# Patient Record
Sex: FEMALE | Employment: FULL TIME | URBAN - METROPOLITAN AREA
[De-identification: names, ages, dates, MRNs, and addresses within clinical notes are randomized per-mention and may not be internally consistent; named-entity substitution may affect disease eponyms.]

---

## 2023-03-28 ENCOUNTER — TELEPHONE (OUTPATIENT)
Dept: PRIMARY CARE | Facility: CLINIC | Age: 43
End: 2023-03-28
Payer: COMMERCIAL

## 2023-03-28 NOTE — TELEPHONE ENCOUNTER
Pt made an appointment for next week for med refill but she is going to be out before then, can you send her generic lexapro in to [facility] in [facility]

## 2023-04-04 ENCOUNTER — OFFICE VISIT (OUTPATIENT)
Dept: PRIMARY CARE | Facility: CLINIC | Age: 43
End: 2023-04-04
Payer: COMMERCIAL

## 2023-04-04 VITALS
BODY MASS INDEX: 24.51 KG/M2 | DIASTOLIC BLOOD PRESSURE: 74 MMHG | SYSTOLIC BLOOD PRESSURE: 107 MMHG | OXYGEN SATURATION: 98 % | HEART RATE: 78 BPM | TEMPERATURE: 97.5 F | WEIGHT: 162.4 LBS

## 2023-04-04 DIAGNOSIS — Z12.31 ENCOUNTER FOR SCREENING MAMMOGRAM FOR BREAST CANCER: ICD-10-CM

## 2023-04-04 DIAGNOSIS — J45.990 EXERCISE-INDUCED ASTHMA (HHS-HCC): ICD-10-CM

## 2023-04-04 DIAGNOSIS — Z12.11 COLON CANCER SCREENING: ICD-10-CM

## 2023-04-04 DIAGNOSIS — Z00.00 VISIT FOR PREVENTIVE HEALTH EXAMINATION: Primary | ICD-10-CM

## 2023-04-04 PROBLEM — M54.12 CERVICAL RADICULAR PAIN: Status: ACTIVE | Noted: 2023-04-04

## 2023-04-04 PROBLEM — F43.22 ADJUSTMENT DISORDER WITH ANXIOUS MOOD: Status: ACTIVE | Noted: 2023-04-04

## 2023-04-04 PROBLEM — D64.9 ANEMIA: Status: ACTIVE | Noted: 2023-04-04

## 2023-04-04 PROBLEM — B37.9 YEAST INFECTION: Status: ACTIVE | Noted: 2023-04-04

## 2023-04-04 PROCEDURE — 99396 PREV VISIT EST AGE 40-64: CPT | Performed by: FAMILY MEDICINE

## 2023-04-04 PROCEDURE — 1036F TOBACCO NON-USER: CPT | Performed by: FAMILY MEDICINE

## 2023-04-04 RX ORDER — MULTIVITAMIN
TABLET ORAL
COMMUNITY
Start: 2019-03-20

## 2023-04-04 RX ORDER — BUSPIRONE HYDROCHLORIDE 10 MG/1
1 TABLET ORAL 3 TIMES DAILY
COMMUNITY
Start: 2019-06-25 | End: 2023-04-04 | Stop reason: ALTCHOICE

## 2023-04-04 RX ORDER — ESCITALOPRAM OXALATE 5 MG/1
5 TABLET ORAL DAILY
COMMUNITY
End: 2023-04-07 | Stop reason: SDUPTHER

## 2023-04-04 RX ORDER — METHYLPREDNISOLONE 4 MG/1
TABLET ORAL
COMMUNITY
Start: 2021-06-16

## 2023-04-04 RX ORDER — ALBUTEROL SULFATE 90 UG/1
2 AEROSOL, METERED RESPIRATORY (INHALATION) EVERY 4 HOURS PRN
Qty: 8.5 G | Refills: 0 | Status: SHIPPED | OUTPATIENT
Start: 2023-04-04 | End: 2023-05-04

## 2023-04-04 ASSESSMENT — PATIENT HEALTH QUESTIONNAIRE - PHQ9
SUM OF ALL RESPONSES TO PHQ9 QUESTIONS 1 AND 2: 0
1. LITTLE INTEREST OR PLEASURE IN DOING THINGS: NOT AT ALL
2. FEELING DOWN, DEPRESSED OR HOPELESS: NOT AT ALL

## 2023-04-04 NOTE — PROGRESS NOTES
Subjective   Patient ID: Alis Marlow is a 42 y.o. female who presents for Med Refill (Medication refill).  Very pleasant 42-year-old here today for annual wellness exam  Her previous history of adjustment disorder that is although she is doing very well  She had some exercise-induced asthma since she had COVID tightness and congestion no fever chills she has not had any hearing or visual issues no fever chills not a smoker Works outside the home no new concerns        Review of Systems  Constitutional: no chills, no fever and no night sweats.   Eyes: no blurred vision and no eyesight problems.   ENT: no hearing loss, no nasal congestion, no nasal discharge, no hoarseness and no sore throat.   Cardiovascular: no chest pain, no intermittent leg claudication, no lower extremity edema, no palpitations and no syncope.   Respiratory: no cough, no shortness of breath during exertion, no shortness of breath at rest and no wheezing.   Gastrointestinal: no abdominal pain, no blood in stools, no constipation, no diarrhea, no melena, no nausea, no rectal pain and no vomiting.   Genitourinary: no dysuria, no change in urinary frequency, no urinary hesitancy, no feelings of urinary urgency and no vaginal discharge.   Musculoskeletal: no arthralgias,  no back pain and no myalgias.   Integumentary: no new skin lesions and no rashes.   Neurological: no difficulty walking, no headache, no limb weakness, no numbness and no tingling.   Psychiatric: no anxiety, no depression, no anhedonia and no substance use disorders.   Endocrine: no recent weight gain and no recent weight loss.   Hematologic/Lymphatic: no tendency for easy bruising and no swollen glands .  Objective    /74 (BP Location: Right arm, Patient Position: Sitting, BP Cuff Size: Adult)   Pulse 78   Temp 36.4 °C (97.5 °F) (Temporal)   Wt 73.7 kg (162 lb 6.4 oz)   SpO2 98%   BMI 24.51 kg/m²    Physical Exam  The patient appeared well nourished and normally  developed. Vital signs as documented. Head exam is unremarkable. No scleral icterus or corneal arcus noted.  Pupils are equal round reactive to light extraocular movements are intact no hemorrhages noted on funduscopic exam mouth mucous membranes are moist no exudates ears canals clear TMs are gray pearly not injected nose no rhinorrhea or epistaxis Neck is without jugular venous distension, thyromegaly, or carotid bruits. Carotid upstrokes are brisk bilaterally. Lungs are clear to auscultation and percussion. Cardiac exam reveals the PMI to be normally sized and situated. Rhythm is regular. First and second heart sounds normal. No murmurs, rubs or gallops. Abdominal exam reveals normal bowel sounds, no masses, no organomegaly and no aortic enlargement. Extremities are nonedematous and both femoral and pedal pulses are normal.  Neurologic exam DTRs are equal bilaterally no focal deficits strength is symmetrical heme lymph no palpable lymph nodes in the neck axilla or groin  Assessment/Plan   Problem List Items Addressed This Visit          Respiratory    Exercise-induced asthma     Stable continue inhaler.  Post exercise         Relevant Medications    albuterol (ProAir HFA) 90 mcg/actuation inhaler       Other    Visit for preventive health examination - Primary     Annual wellness exam  Continue healthy diet and exercise  Schedule mammogram  Biometric screening         Relevant Orders    Comprehensive Metabolic Panel    Hemoglobin A1C    Lipid Panel    TSH     Other Visit Diagnoses       Encounter for screening mammogram for breast cancer        Relevant Orders    BI mammo bilateral screening tomosynthesis    Colon cancer screening        Relevant Orders    Colonoscopy                 Selena Stover MD

## 2023-04-07 DIAGNOSIS — F41.9 ANXIETY: Primary | ICD-10-CM

## 2023-04-07 RX ORDER — ESCITALOPRAM OXALATE 5 MG/1
5 TABLET ORAL DAILY
Qty: 90 TABLET | Refills: 0 | Status: SHIPPED | OUTPATIENT
Start: 2023-04-07 | End: 2023-07-04

## 2023-04-12 LAB
NON-UH HIE A/G RATIO: 1.2
NON-UH HIE ALB: 3.6 G/DL (ref 3.4–5)
NON-UH HIE ALK PHOS: 37 UNIT/L (ref 46–116)
NON-UH HIE BILIRUBIN, TOTAL: 0.6 MG/DL (ref 0.2–1)
NON-UH HIE BUN/CREAT RATIO: 26.2
NON-UH HIE BUN: 21 MG/DL (ref 9–23)
NON-UH HIE CALCIUM: 9.2 MG/DL (ref 8.7–10.4)
NON-UH HIE CALCULATED LDL CHOLESTEROL: 98 MG/DL (ref 60–130)
NON-UH HIE CALCULATED OSMOLALITY: 281 MOSM/KG (ref 275–295)
NON-UH HIE CHLORIDE: 107 MMOL/L (ref 98–107)
NON-UH HIE CHOLESTEROL: 170 MG/DL (ref 100–200)
NON-UH HIE CO2, VENOUS: 26 MMOL/L (ref 20–31)
NON-UH HIE CREATININE: 0.8 MG/DL (ref 0.5–0.8)
NON-UH HIE GFR AA: >60
NON-UH HIE GLOBULIN: 2.9 G/DL
NON-UH HIE GLOMERULAR FILTRATION RATE: >60 ML/MIN/1.73M?
NON-UH HIE GLUCOSE: 78 MG/DL (ref 74–106)
NON-UH HIE GOT: 31 UNIT/L (ref 15–37)
NON-UH HIE GPT: 20 UNIT/L (ref 10–49)
NON-UH HIE HDL CHOLESTEROL: 65 MG/DL (ref 40–60)
NON-UH HIE HGB A1C: 4.8 %
NON-UH HIE K: 4.3 MMOL/L (ref 3.5–5.1)
NON-UH HIE NA: 140 MMOL/L (ref 135–145)
NON-UH HIE TOTAL CHOL/HDL CHOL RATIO: 2.6
NON-UH HIE TOTAL PROTEIN: 6.5 G/DL (ref 5.7–8.2)
NON-UH HIE TRIGLYCERIDES: 35 MG/DL (ref 30–150)
NON-UH HIE TSH: 1.13 UIU/ML (ref 0.55–4.78)

## 2023-04-13 NOTE — RESULT ENCOUNTER NOTE
Please call Alis and tell her that I reviewed her blood work and her cholesterol is 170 her triglycerides are 35 and her HDL is 65 these levels are outstanding  Her thyroid and hemoglobin A1c are normal  Her liver and kidney function are normal her electrolytes are normal and her blood sugar is normal  All is healthy and normal  I am happy with the results  Please tell her to call if she needs anything

## 2023-04-14 NOTE — TELEPHONE ENCOUNTER
----- Message from Selena Stover MD sent at 4/13/2023  5:01 PM EDT -----  Please call Alis and tell her that I reviewed her blood work and her cholesterol is 170 her triglycerides are 35 and her HDL is 65 these levels are outstanding  Her thyroid and hemoglobin A1c are normal  Her liver and kidney function are normal her electrolytes are normal and her blood sugar is normal  All is healthy and normal  I am happy with the results  Please tell her to call if she needs anything

## 2023-04-18 PROBLEM — J45.990 EXERCISE-INDUCED ASTHMA (HHS-HCC): Status: ACTIVE | Noted: 2023-04-18

## 2023-04-18 PROBLEM — Z00.00 VISIT FOR PREVENTIVE HEALTH EXAMINATION: Status: ACTIVE | Noted: 2023-04-18

## 2023-10-07 DIAGNOSIS — F41.9 ANXIETY: ICD-10-CM

## 2023-10-13 RX ORDER — ESCITALOPRAM OXALATE 5 MG/1
TABLET ORAL
Qty: 90 TABLET | Refills: 3 | Status: SHIPPED | OUTPATIENT
Start: 2023-10-13

## 2024-10-23 ENCOUNTER — APPOINTMENT (OUTPATIENT)
Dept: PRIMARY CARE | Facility: CLINIC | Age: 44
End: 2024-10-23
Payer: COMMERCIAL

## 2024-10-23 VITALS
DIASTOLIC BLOOD PRESSURE: 72 MMHG | SYSTOLIC BLOOD PRESSURE: 103 MMHG | HEIGHT: 67 IN | TEMPERATURE: 97.3 F | BODY MASS INDEX: 27.47 KG/M2 | OXYGEN SATURATION: 98 % | WEIGHT: 175 LBS | HEART RATE: 75 BPM

## 2024-10-23 DIAGNOSIS — Z00.00 ANNUAL PHYSICAL EXAM: Primary | ICD-10-CM

## 2024-10-23 DIAGNOSIS — J31.0 CHRONIC RHINITIS: ICD-10-CM

## 2024-10-23 DIAGNOSIS — Z12.31 ENCOUNTER FOR SCREENING MAMMOGRAM FOR BREAST CANCER: ICD-10-CM

## 2024-10-23 DIAGNOSIS — Z12.4 CERVICAL CANCER SCREENING: ICD-10-CM

## 2024-10-23 DIAGNOSIS — J45.990 EXERCISE-INDUCED ASTHMA (HHS-HCC): ICD-10-CM

## 2024-10-23 DIAGNOSIS — F43.22 ADJUSTMENT DISORDER WITH ANXIOUS MOOD: ICD-10-CM

## 2024-10-23 PROBLEM — K82.8 BILIARY DYSKINESIA: Status: RESOLVED | Noted: 2024-10-23 | Resolved: 2024-10-23

## 2024-10-23 PROBLEM — E04.1 THYROID CYST: Status: ACTIVE | Noted: 2024-10-23

## 2024-10-23 PROBLEM — R10.11 COLICKY RIGHT UPPER QUADRANT PAIN: Status: RESOLVED | Noted: 2024-10-23 | Resolved: 2024-10-23

## 2024-10-23 PROBLEM — Z90.49 S/P LAPAROSCOPIC CHOLECYSTECTOMY: Status: ACTIVE | Noted: 2024-10-23

## 2024-10-23 PROBLEM — R10.9 ABDOMINAL PAIN: Status: RESOLVED | Noted: 2024-10-23 | Resolved: 2024-10-23

## 2024-10-23 PROBLEM — R14.0 BLOATING: Status: RESOLVED | Noted: 2024-10-23 | Resolved: 2024-10-23

## 2024-10-23 PROBLEM — M54.9 BACK PAIN: Status: RESOLVED | Noted: 2024-10-23 | Resolved: 2024-10-23

## 2024-10-23 PROBLEM — I87.2 PERIPHERAL VENOUS INSUFFICIENCY: Status: ACTIVE | Noted: 2024-10-23

## 2024-10-23 PROBLEM — K82.9 GALLBLADDER DISEASE: Status: RESOLVED | Noted: 2024-10-23 | Resolved: 2024-10-23

## 2024-10-23 PROBLEM — F41.9 ANXIETY: Status: RESOLVED | Noted: 2024-10-23 | Resolved: 2024-10-23

## 2024-10-23 PROBLEM — R87.629 ABNORMAL PAP SMEAR OF VAGINA: Status: RESOLVED | Noted: 2024-10-23 | Resolved: 2024-10-23

## 2024-10-23 PROBLEM — R14.3 FLATULENCE SYMPTOM: Status: RESOLVED | Noted: 2024-10-23 | Resolved: 2024-10-23

## 2024-10-23 PROBLEM — K58.9 IRRITABLE BOWEL SYNDROME: Status: ACTIVE | Noted: 2024-10-23

## 2024-10-23 PROBLEM — J01.90 ACUTE SINUSITIS: Status: RESOLVED | Noted: 2024-05-31 | Resolved: 2024-10-23

## 2024-10-23 LAB
NON-UH HIE A/G RATIO: 1
NON-UH HIE ALB: 3.5 G/DL (ref 3.4–5)
NON-UH HIE ALK PHOS: 55 UNIT/L (ref 45–117)
NON-UH HIE BASO COUNT: 0.07 X1000 (ref 0–0.2)
NON-UH HIE BASOS %: 0.9 %
NON-UH HIE BILIRUBIN, TOTAL: 0.8 MG/DL (ref 0.3–1.2)
NON-UH HIE BUN/CREAT RATIO: 15.6
NON-UH HIE BUN: 14 MG/DL (ref 9–23)
NON-UH HIE CALCIUM: 9.7 MG/DL (ref 8.7–10.4)
NON-UH HIE CALCULATED LDL CHOLESTEROL: 111 MG/DL (ref 60–130)
NON-UH HIE CALCULATED OSMOLALITY: 279 MOSM/KG (ref 275–295)
NON-UH HIE CHLORIDE: 105 MMOL/L (ref 98–107)
NON-UH HIE CHOLESTEROL: 185 MG/DL (ref 100–200)
NON-UH HIE CO2, VENOUS: 31 MMOL/L (ref 20–31)
NON-UH HIE CREATININE: 0.9 MG/DL (ref 0.5–0.8)
NON-UH HIE DIFF?: NO
NON-UH HIE EOS COUNT: 0.16 X1000 (ref 0–0.5)
NON-UH HIE EOSIN %: 2.1 %
NON-UH HIE GFR AA: >60
NON-UH HIE GLOBULIN: 3.4 G/DL
NON-UH HIE GLOMERULAR FILTRATION RATE: >60 ML/MIN/1.73M?
NON-UH HIE GLUCOSE: 84 MG/DL (ref 74–106)
NON-UH HIE GOT: 18 UNIT/L (ref 15–37)
NON-UH HIE GPT: 12 UNIT/L (ref 10–49)
NON-UH HIE HCT: 44.1 % (ref 36–46)
NON-UH HIE HDL CHOLESTEROL: 61 MG/DL (ref 40–60)
NON-UH HIE HEPATITIS C ANTIBODY: NONREACTIVE
NON-UH HIE HGB A1C: 5.1 %
NON-UH HIE HGB: 14.7 G/DL (ref 12–16)
NON-UH HIE INSTR WBC: 7.8
NON-UH HIE K: 4.5 MMOL/L (ref 3.5–5.1)
NON-UH HIE LYMPH %: 28.2 %
NON-UH HIE LYMPH COUNT: 2.19 X1000 (ref 1.2–4.8)
NON-UH HIE MCH: 29.9 PG (ref 27–34)
NON-UH HIE MCHC: 33.3 G/DL (ref 32–37)
NON-UH HIE MCV: 89.8 FL (ref 80–100)
NON-UH HIE MONO %: 5.8 %
NON-UH HIE MONO COUNT: 0.45 X1000 (ref 0.1–1)
NON-UH HIE MPV: 9.8 FL (ref 7.4–10.4)
NON-UH HIE NA: 140 MMOL/L (ref 135–145)
NON-UH HIE NEUTROPHIL %: 63.1 %
NON-UH HIE NEUTROPHIL COUNT (ANC): 4.91 X1000 (ref 1.4–8.8)
NON-UH HIE NUCLEATED RBC: 0 /100WBC
NON-UH HIE PLATELET: 244 X10 (ref 150–450)
NON-UH HIE RBC: 4.91 X10 (ref 4.2–5.4)
NON-UH HIE RDW: 12.5 % (ref 11.5–14.5)
NON-UH HIE TOTAL CHOL/HDL CHOL RATIO: 3
NON-UH HIE TOTAL PROTEIN: 6.9 G/DL (ref 5.7–8.2)
NON-UH HIE TRIGLYCERIDES: 65 MG/DL (ref 30–150)
NON-UH HIE TSH: 1.31 UIU/ML (ref 0.55–4.78)
NON-UH HIE VIT D 25: 31 NG/ML
NON-UH HIE WBC: 7.8 X10 (ref 4.5–11)

## 2024-10-23 PROCEDURE — 99396 PREV VISIT EST AGE 40-64: CPT | Performed by: FAMILY MEDICINE

## 2024-10-23 PROCEDURE — 3008F BODY MASS INDEX DOCD: CPT | Performed by: FAMILY MEDICINE

## 2024-10-23 PROCEDURE — 88141 CYTOPATH C/V INTERPRET: CPT | Performed by: PATHOLOGY

## 2024-10-23 PROCEDURE — 88175 CYTOPATH C/V AUTO FLUID REDO: CPT

## 2024-10-23 PROCEDURE — 99212 OFFICE O/P EST SF 10 MIN: CPT | Performed by: FAMILY MEDICINE

## 2024-10-23 RX ORDER — ESCITALOPRAM OXALATE 5 MG/1
5 TABLET ORAL DAILY
Qty: 90 TABLET | Refills: 3 | Status: SHIPPED | OUTPATIENT
Start: 2024-10-23

## 2024-10-23 RX ORDER — FLUTICASONE PROPIONATE 50 MCG
1 SPRAY, SUSPENSION (ML) NASAL DAILY
Qty: 16 G | Refills: 11 | Status: SHIPPED | OUTPATIENT
Start: 2024-10-23 | End: 2025-10-23

## 2024-10-23 RX ORDER — ALBUTEROL SULFATE 90 UG/1
2 INHALANT RESPIRATORY (INHALATION) EVERY 4 HOURS PRN
Qty: 18 G | Refills: 11 | Status: SHIPPED | OUTPATIENT
Start: 2024-10-23 | End: 2025-10-23

## 2024-11-01 ENCOUNTER — HOSPITAL ENCOUNTER (OUTPATIENT)
Dept: RADIOLOGY | Facility: CLINIC | Age: 44
Discharge: HOME | End: 2024-11-01
Payer: COMMERCIAL

## 2024-11-01 VITALS — WEIGHT: 170 LBS | BODY MASS INDEX: 26.68 KG/M2 | HEIGHT: 67 IN

## 2024-11-01 DIAGNOSIS — Z12.31 ENCOUNTER FOR SCREENING MAMMOGRAM FOR BREAST CANCER: ICD-10-CM

## 2024-11-01 PROCEDURE — 77067 SCR MAMMO BI INCL CAD: CPT | Performed by: RADIOLOGY

## 2024-11-01 PROCEDURE — 77063 BREAST TOMOSYNTHESIS BI: CPT | Performed by: RADIOLOGY

## 2024-11-01 PROCEDURE — 77067 SCR MAMMO BI INCL CAD: CPT

## 2024-11-02 ENCOUNTER — HOSPITAL ENCOUNTER (OUTPATIENT)
Dept: RADIOLOGY | Facility: EXTERNAL LOCATION | Age: 44
Discharge: HOME | End: 2024-11-02
Payer: COMMERCIAL

## 2024-11-02 DIAGNOSIS — R92.8 ABNORMAL MAMMOGRAM: Primary | ICD-10-CM

## 2024-11-05 LAB
CYTOLOGY CMNT CVX/VAG CYTO-IMP: NORMAL
LAB AP HPV GENOTYPE QUESTION: YES
LAB AP HPV HR: NORMAL
LABORATORY COMMENT REPORT: NORMAL
PATH REPORT.TOTAL CANCER: NORMAL

## 2024-11-15 ENCOUNTER — APPOINTMENT (OUTPATIENT)
Dept: RADIOLOGY | Facility: CLINIC | Age: 44
End: 2024-11-15
Payer: COMMERCIAL

## 2024-11-19 ENCOUNTER — HOSPITAL ENCOUNTER (OUTPATIENT)
Dept: RADIOLOGY | Facility: CLINIC | Age: 44
Discharge: HOME | End: 2024-11-19
Payer: COMMERCIAL

## 2024-11-19 DIAGNOSIS — R92.8 ABNORMAL MAMMOGRAM: ICD-10-CM

## 2024-11-19 PROCEDURE — 77062 BREAST TOMOSYNTHESIS BI: CPT | Performed by: RADIOLOGY

## 2024-11-19 PROCEDURE — 77062 BREAST TOMOSYNTHESIS BI: CPT

## 2024-11-19 PROCEDURE — 77066 DX MAMMO INCL CAD BI: CPT | Performed by: RADIOLOGY

## 2024-11-23 DIAGNOSIS — F43.22 ADJUSTMENT DISORDER WITH ANXIOUS MOOD: ICD-10-CM

## 2024-11-25 RX ORDER — ESCITALOPRAM OXALATE 5 MG/1
5 TABLET ORAL DAILY
Qty: 90 TABLET | Refills: 3 | Status: SHIPPED | OUTPATIENT
Start: 2024-11-25

## 2025-08-28 ENCOUNTER — HOSPITAL ENCOUNTER (OUTPATIENT)
Dept: RADIOLOGY | Facility: HOSPITAL | Age: 45
Discharge: HOME | End: 2025-08-28
Payer: COMMERCIAL

## 2025-08-28 DIAGNOSIS — R10.9 FLANK PAIN: Primary | ICD-10-CM

## 2025-08-28 DIAGNOSIS — R10.9 FLANK PAIN: ICD-10-CM

## 2025-08-28 PROCEDURE — 74176 CT ABD & PELVIS W/O CONTRAST: CPT

## 2025-08-29 ENCOUNTER — TELEMEDICINE (OUTPATIENT)
Dept: UROLOGY | Facility: CLINIC | Age: 45
End: 2025-08-29
Payer: COMMERCIAL

## 2025-08-29 DIAGNOSIS — R10.9 FLANK PAIN: Primary | ICD-10-CM

## 2025-08-29 DIAGNOSIS — M51.26 LUMBAR HERNIATED DISC: ICD-10-CM

## 2025-08-29 PROCEDURE — 99203 OFFICE O/P NEW LOW 30 MIN: CPT | Performed by: UROLOGY

## 2025-08-29 PROCEDURE — 1036F TOBACCO NON-USER: CPT | Performed by: UROLOGY

## 2025-08-29 RX ORDER — METHYLPREDNISOLONE 4 MG/1
TABLET ORAL
Qty: 21 TABLET | Refills: 0 | Status: SHIPPED | OUTPATIENT
Start: 2025-08-29 | End: 2025-09-04

## 2025-08-30 LAB
BACTERIA #/AREA URNS HPF: NORMAL /HPF
BACTERIA UR CULT: NORMAL
HYALINE CASTS #/AREA URNS LPF: NORMAL /LPF
RBC #/AREA URNS HPF: NORMAL /HPF
SERVICE CMNT-IMP: NORMAL
SQUAMOUS #/AREA URNS HPF: NORMAL /HPF
WBC #/AREA URNS HPF: NORMAL /HPF

## 2025-09-05 ENCOUNTER — TELEPHONE (OUTPATIENT)
Dept: UROLOGY | Facility: HOSPITAL | Age: 45
End: 2025-09-05
Payer: COMMERCIAL